# Patient Record
Sex: MALE | Race: BLACK OR AFRICAN AMERICAN
[De-identification: names, ages, dates, MRNs, and addresses within clinical notes are randomized per-mention and may not be internally consistent; named-entity substitution may affect disease eponyms.]

---
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
## 2018-03-03 ENCOUNTER — HOSPITAL ENCOUNTER (EMERGENCY)
Dept: HOSPITAL 25 - UCCORT | Age: 23
Discharge: HOME | End: 2018-03-03
Payer: COMMERCIAL

## 2018-03-03 VITALS — SYSTOLIC BLOOD PRESSURE: 123 MMHG | DIASTOLIC BLOOD PRESSURE: 82 MMHG

## 2018-03-03 DIAGNOSIS — Z11.4: ICD-10-CM

## 2018-03-03 DIAGNOSIS — Z11.3: Primary | ICD-10-CM

## 2018-03-03 PROCEDURE — 99211 OFF/OP EST MAY X REQ PHY/QHP: CPT

## 2018-03-03 PROCEDURE — 87491 CHLMYD TRACH DNA AMP PROBE: CPT

## 2018-03-03 PROCEDURE — 87591 N.GONORRHOEAE DNA AMP PROB: CPT

## 2018-03-03 PROCEDURE — 86592 SYPHILIS TEST NON-TREP QUAL: CPT

## 2018-03-03 PROCEDURE — 36415 COLL VENOUS BLD VENIPUNCTURE: CPT

## 2018-03-03 PROCEDURE — 86703 HIV-1/HIV-2 1 RESULT ANTBDY: CPT

## 2018-03-03 PROCEDURE — 81003 URINALYSIS AUTO W/O SCOPE: CPT

## 2018-03-03 PROCEDURE — G0463 HOSPITAL OUTPT CLINIC VISIT: HCPCS

## 2018-03-03 NOTE — UC
Complaint Male HPI





- HPI Summary


HPI Summary: 





pt states has a new partner and would like std testing. denies any discharge or 

lesions. had chlamydia once and it "made my pee hole narrow". was concerned 

that may be happening but now doesn't have any symptoms thus was "probably all 

in my head".





- History of Current Complaint


Chief Complaint: UCSTDScreening


Stated Complaint: PERSONAL


Time Seen by Provider: 03/03/18 19:58


Hx Obtained From: Patient


Timing: Constant


Pain Intensity: 0


Aggravating Factor(s): Nothing


Alleviating Factor(s): Nothing


Associated Signs And Symptoms: Negative: Fever, Hematuria, Dysuria, Penile 

Swelling, Penile Discharge


Prior STD Hx: chlamydia





- Risk Factors


Testicular Torsion: Negative





- Allergies/Home Medications


Allergies/Adverse Reactions: 


 Allergies











Allergy/AdvReac Type Severity Reaction Status Date / Time


 


No Known Allergies Allergy   Verified 03/03/18 19:28











Home Medications: 


 Home Medications





NK [No Home Medications Reported]  03/03/18 [History Confirmed 03/03/18]











PMH/Surg Hx/FS Hx/Imm Hx


Previously Healthy: Yes





- Surgical History


Surgical History: None





- Family History


Known Family History: Positive: None


Family History: denies cardio vascular issues in family lineage





- Social History


Occupation: Employed Full-time


Lives: Alone


Alcohol Use: Occasionally


Substance Use Type: None


Smoking Status (MU): Never Smoked Tobacco


Household Exposure Type: Cigarettes





- Immunization History


Vaccination Up to Date: Yes





Review of Systems


Constitutional: Negative


Skin: Negative


Eyes: Negative


ENT: Negative


Respiratory: Negative


Cardiovascular: Negative


Gastrointestinal: Negative


Genitourinary: Negative


Motor: Negative


Neurovascular: Negative


Musculoskeletal: Negative


Neurological: Negative


Psychological: Negative


Is Patient Immunocompromised?: No


All Other Systems Reviewed And Are Negative: Yes





Physical Exam


Triage Information Reviewed: Yes


Appearance: Well-Appearing


Vital Signs: 


 Initial Vital Signs











Temp  98 F   03/03/18 19:17


 


Pulse  61   03/03/18 19:17


 


Resp  14   03/03/18 19:17


 


BP  123/82   03/03/18 19:17


 


Pulse Ox  100   03/03/18 19:17











Vital Signs Reviewed: Yes


Eyes: Positive: Conjunctiva Clear


ENT: Positive: Normal ENT inspection, Pharynx normal, TMs normal.  Negative: 

Nasal congestion


Neck: Positive: Supple, Nontender, No Lymphadenopathy


Respiratory: Positive: Lungs clear, Normal breath sounds


Cardiovascular: Positive: RRR, No Murmur


Abdomen Description: Positive: Nontender, No Organomegaly, Soft


Bowel Sounds: Positive: Present


Musculoskeletal: Positive: ROM Intact


Neurological: Positive: Alert


Psychological: Positive: Age Appropriate Behavior


Skin Exam: Normal





UC Physical Exam


Vital Signs On Initial Exam: 


 Initial Vitals











Temp Pulse Resp BP Pulse Ox


 


 98 F   61   14   123/82   100 


 


 03/03/18 19:17  03/03/18 19:17  03/03/18 19:17  03/03/18 19:17  03/03/18 19:17














- Genitalia Exam


Male Genitalia: Circumcised, Other - no lesions or discharge


Male Genitalia Cont.: Bilateral: Testicles Descended, Testicles Non-Tender, 

Testicles w/o Swelling, Scrotum Non-Tender





Diagnostics





- Laboratory


Diagnostic Studies Completed/Ordered: u/a=1+ protein, otherwise unremarkable. gc

, chlamydia, HIV and syphilis testing are pending





 Complaint Male Course/Dx





- Course


Course Of Treatment: no pcp so referal to N given. no sign of active std thus 

no tx at this time but testing is pending.





- Differential Dx/Diagnosis


Provider Diagnoses: STD evaluation





Discharge





- Discharge Plan


Condition: Stable


Disposition: HOME


Patient Education Materials:  Sexually Transmitted Diseases (ED)


Referrals: 


Non Staff,Doctor [Primary Care Provider] - 


RODRIGO Cohen [Medical Doctor] -